# Patient Record
(demographics unavailable — no encounter records)

---

## 2024-10-11 NOTE — PHYSICAL EXAM
[No Acute Distress] : no acute distress [No Respiratory Distress] : no respiratory distress  [Clear to Auscultation] : lungs were clear to auscultation bilaterally [Normal Rate] : normal rate  [Regular Rhythm] : with a regular rhythm [Normal S1, S2] : normal S1 and S2 [No Edema] : there was no peripheral edema [Soft] : abdomen soft [Non Tender] : non-tender [Normal Bowel Sounds] : normal bowel sounds [Speech Grossly Normal] : speech grossly normal

## 2024-10-11 NOTE — HISTORY OF PRESENT ILLNESS
[Diabetes Mellitus] : Diabetes Mellitus [Hyperlipidemia] : Hyperlipidemia [Hypertension] : Hypertension [Other: ___] : [unfilled] [Patient was last seen on ___] : Patient was last seen on [unfilled] [Family Member] : family member [No episodes] : No hypoglycemic episodes since the last visit. [Check glucose ___ x/day] : Patient checks  blood glucose [unfilled]  times a day [Fasting:  ___] : Fasting Blood Sugar: [unfilled] mg/dL [] :  never  in range [Most Recent A1C: ___] : Most recent A1C was [unfilled] [Target A1C:  ___] : Target A1C is [unfilled] [Nephropathy] : nephropathy [High Intensity] : Patient is currently on high intensity statin therapy [Does not check BP] : The patient is not checking blood pressure [<130/90] : Target blood pressure is  <130/90 [Target goal met] : BP target goal met [Stable] : Patient is stable [Managed with medications] : managed with  medication [High Intensity therapy] : Patient is currently on high intensity statin therapy [de-identified] : Last two weeks BG has been elevated.  No known changes to dietary intake from baseline.  Previously was on Jardiance 25 mg but was discontinued due to dehydration and UTI.  She is currently on renally dose Januvia 50 mg daily.  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  [de-identified] : Currently on Valsartan 80 mg daily [FreeTextEntry1] : Patient with CKD, SCr 1.3-1.6.  Has not yet consulted nephrology.

## 2024-11-22 NOTE — REVIEW OF SYSTEMS
[As Noted in HPI] : as noted in HPI [Fever] : no fever [Chills] : no chills [Feeling Poorly] : not feeling poorly [Dry Eyes] : no dryness of the eyes [Sore Throat] : no sore throat [Lower Ext Edema] : no lower extremity edema [Cough] : no cough [SOB on Exertion] : no shortness of breath during exertion [Abdominal Pain] : no abdominal pain [Dysuria] : no dysuria [Limb Pain] : no limb pain [Itching] : no itching [Dizziness] : no dizziness [Anxiety] : no anxiety [Muscle Weakness] : no muscle weakness

## 2024-11-22 NOTE — HISTORY OF PRESENT ILLNESS
[FreeTextEntry1] : Pt is a 86-year-old female with history of dementia, longstanding DM (>10 years), HTN, HLD, CKD, CAD s/p stent and history of depression who came to the clinic for the initial evaluation of CKD. Pt accompanied by his daughter who provided the history.    Pt lives alone and has health aide most of the day. Pt gives long-standing history of DM and HTN. Pt says she was made aware that she had kidney disease by her PCP and was advised see a nephrologist recently. On review, Scr was mildly elevated at 1.34 on 3/14/24. Scr remained at 1.41 on 7/24/24. Last Scr was 1.2 on 10/11/24. Last HbA1c level was elevated at 8.8% on 10/11/24.  Currently on Januvia 50 mg daily. UA done on 10/11/24 showed proteinuria. Pt. Denies history of kidney stones in the past. No history of kidney disease in family. Denies recent use of NSAIDs/ motrin recently.   Pt. currently feels well. Pt. denies any chest pain, SOB, nausea, dysuria, weakness. Pt endorses minimal water/fluid intake.

## 2024-11-22 NOTE — PHYSICAL EXAM
[General Appearance - Alert] : alert [General Appearance - In No Acute Distress] : in no acute distress [Sclera] : the sclera and conjunctiva were normal [Outer Ear] : the ears and nose were normal in appearance [Jugular Venous Distention Increased] : there was no jugular-venous distention [Auscultation Breath Sounds / Voice Sounds] : lungs were clear to auscultation bilaterally [Heart Sounds] : normal S1 and S2 [Edema] : there was no peripheral edema [Bowel Sounds] : normal bowel sounds [Abdomen Soft] : soft [No CVA Tenderness] : no ~M costovertebral angle tenderness [Nail Clubbing] : no clubbing  or cyanosis of the fingernails [] : no rash [No Focal Deficits] : no focal deficits [Affect] : the affect was normal

## 2024-11-22 NOTE — ASSESSMENT
[FreeTextEntry1] : 1.CKD [id number]: likely in the setting of longstanding DM and HTN. Although she might be having a mild component of poor oral intake.  On review, Scr was mildly elevated at 1.34 on 3/14/24. Scr remained at 1.41 on 7/24/24. Last Scr was 1.2 on 10/11/24. UA done on 10/11/24 showed proteinuria. Importance of good glycemic and BP control reviewed with patient. Advised to improve oral fluid intake ~ 2L per day. Continue with ARB therapy for HTN and anti protineuric effect. Advised patient to avoid NSAIDs, RCAs, and other nephrotoxins. Monitor renal function.  2.HTN: Repeat BP reading in acceptable range during office visit. Continue to monitor BP on current BP meds. Low salt diet advised.  3.DM: Recent HbA1C level was elevated at  8.8% on 10/11/24. Healthy lifestyle and eating habits advised. Advised to follow up with PCP for further management of DM.   Follow up in 2 months.   40 minutes: spent in obtaining and reviewing previous records, performing the visit, counseling/coordination of care, creating orders, and documenting the encounter.